# Patient Record
Sex: FEMALE | URBAN - METROPOLITAN AREA
[De-identification: names, ages, dates, MRNs, and addresses within clinical notes are randomized per-mention and may not be internally consistent; named-entity substitution may affect disease eponyms.]

---

## 2019-11-13 ENCOUNTER — APPOINTMENT (RX ONLY)
Dept: URBAN - METROPOLITAN AREA CLINIC 73 | Facility: CLINIC | Age: 49
Setting detail: DERMATOLOGY
End: 2019-11-13

## 2019-11-13 DIAGNOSIS — L72.8 OTHER FOLLICULAR CYSTS OF THE SKIN AND SUBCUTANEOUS TISSUE: ICD-10-CM

## 2019-11-13 DIAGNOSIS — L90.5 SCAR CONDITIONS AND FIBROSIS OF SKIN: ICD-10-CM

## 2019-11-13 PROCEDURE — ? OBSERVATION AND MEASURE

## 2019-11-13 PROCEDURE — ? OTHER

## 2019-11-13 PROCEDURE — ? COSMETIC CONSULTATION - MICRO-NEEDLING

## 2019-11-13 PROCEDURE — 99202 OFFICE O/P NEW SF 15 MIN: CPT

## 2019-11-13 PROCEDURE — ? COUNSELING

## 2019-11-13 ASSESSMENT — LOCATION ZONE DERM
LOCATION ZONE: FACE
LOCATION ZONE: AXILLAE

## 2019-11-13 ASSESSMENT — LOCATION DETAILED DESCRIPTION DERM
LOCATION DETAILED: RIGHT AXILLARY VAULT
LOCATION DETAILED: RIGHT INFERIOR CENTRAL MALAR CHEEK
LOCATION DETAILED: LEFT INFERIOR CENTRAL MALAR CHEEK
LOCATION DETAILED: RIGHT POSTERIOR AXILLA

## 2019-11-13 ASSESSMENT — LOCATION SIMPLE DESCRIPTION DERM
LOCATION SIMPLE: RIGHT CHEEK
LOCATION SIMPLE: RIGHT AXILLARY VAULT
LOCATION SIMPLE: LEFT CHEEK
LOCATION SIMPLE: RIGHT POSTERIOR AXILLA

## 2019-11-13 NOTE — PROCEDURE: MIPS QUALITY
Quality 111:Pneumonia Vaccination Status For Older Adults: Pneumococcal Vaccination not Administered or Previously Received, Reason not Otherwise Specified
Quality 130: Documentation Of Current Medications In The Medical Record: Current Medications Documented
Detail Level: Detailed
Quality 131: Pain Assessment And Follow-Up: No documentation of pain assessment, reason not given
Quality 110: Preventive Care And Screening: Influenza Immunization: Influenza immunization was not ordered or administered, reason not given
Quality 474: Zoster Vaccination Status: Shingrix Vaccination not Administered or Previously Received, Reason not Otherwise Specified

## 2019-11-13 NOTE — HPI: CYST
How Severe Is Your Cyst?: moderate
Is This A New Presentation, Or A Follow-Up?: Cysts
Additional History: Pt reports two cysts have chunks and enlarged over the span of one year. Pt would like to confirm diagnosis and make sure lesions are benign. Pt would also like to discuss treatment options today.

## 2019-11-13 NOTE — PROCEDURE: OTHER
Detail Level: Simple
Other (Free Text): - Pt reports history of acne and did 2 rounds of Accutane\\n- Pt reports trialing with fraxel laser, peels, juvederm filler, and retin-A for scarring \\n- Discussed Microneedling. R/b/sed. Expectations managed. Pt will need approx 3-6 treatment. PCE given to pt today. Pt denies history of cold sores, skin infections or warts
Note Text (......Xxx Chief Complaint.): This diagnosis correlates with the

## 2019-11-13 NOTE — PROCEDURE: OBSERVATION
2/1/2018      RE: [facility]  [id number]  [facility] [id number]       Active problem list: None    Inactive problem list:  Low back Pain no Radiculopathy    PHYSICAL EXAMNATION:      Cervical:  Full range of motion, no paraspinal muscle tenderness, no tenderness over the spinous process, no pain with axial loading, 5/5 strength throughout his upper extremities including shoulder shrug.    Shoulder:  Full range of motion, bilaterally.  No signs of instability or impingement, 5/5 strength of his rotator cuff.   He has full range of motion of the right shoulder, negative palpation tenderness.    Hand:  Normal exam.    Elbow:  Full range of motion, stable to varus and valgus stress, no effusion, full spination/pronation.      Wrist: Full range of motion of the wrist.    Spine: Full range of motion, forward flexion, lateral bending in extension.  No discomfort with single leg extension and no paraspinal muscle tenderness to palpation or with spinous processes.  He has 2+ reflexes throughout his lower extremity and 5/5 strength, negative straight leg raising test in the sitting position and lying down.      Hips: Full range of motion, 5/5 strength in flexion, extension, abduction, adduction, no groin pain.    Knee:   Full range of motion, stable to varus and valgus stress, negative Lachman s, negative pivot shift, Negative posterior drawer.  No medial or lateral joint line pain, no effusion, negative patella femoral signs or symptoms, negative patella tilt, negative patella glide.      Ankle:  Full range of motion, 5/5 strength with dorsiflexion, plantar flexion, inversion and eversion, negative anterior drawer, negative external rotation test.      Foot:   Normal.    X-RAYS:   No X-rays were obtained today.    IMPRESSION:  Pass/ No restrictions    Will give back exercise program      Samy Alvares MD    
Size Of Lesion: 8-10mm
Detail Level: Detailed
Size Of Lesion: 8-10 mm

## 2019-11-13 NOTE — HPI: SCAR
Is This A New Presentation, Or A Follow-Up?: Scars
How Severe Is Your Scar?: moderate
Additional History: Pt reports severe acne as a child. Pt reports being on two rounds of Accutane as a young adult. Pt reports trialing with fraxel lasers, peels, juvederm, and retin-A. Pt reports little benefit with treatments. Pt would like to see if any other procedures could help with her acne.